# Patient Record
Sex: FEMALE | Race: BLACK OR AFRICAN AMERICAN | NOT HISPANIC OR LATINO | Employment: UNEMPLOYED | URBAN - METROPOLITAN AREA
[De-identification: names, ages, dates, MRNs, and addresses within clinical notes are randomized per-mention and may not be internally consistent; named-entity substitution may affect disease eponyms.]

---

## 2019-06-03 PROBLEM — E11.9 TYPE 2 DIABETES MELLITUS WITHOUT COMPLICATION, WITH LONG-TERM CURRENT USE OF INSULIN: Status: ACTIVE | Noted: 2019-06-03

## 2019-06-03 PROBLEM — I10 ESSENTIAL HYPERTENSION: Status: ACTIVE | Noted: 2019-06-03

## 2019-06-03 PROBLEM — Z79.4 TYPE 2 DIABETES MELLITUS WITHOUT COMPLICATION, WITH LONG-TERM CURRENT USE OF INSULIN: Status: ACTIVE | Noted: 2019-06-03

## 2020-02-04 PROBLEM — M25.561 RIGHT KNEE PAIN: Status: ACTIVE | Noted: 2020-02-04

## 2020-07-20 PROBLEM — I21.4 NSTEMI (NON-ST ELEVATED MYOCARDIAL INFARCTION): Status: ACTIVE | Noted: 2018-01-20

## 2021-01-09 PROBLEM — H25.813 COMBINED FORM OF AGE-RELATED CATARACT, BOTH EYES: Status: ACTIVE | Noted: 2021-01-09

## 2021-01-09 PROBLEM — E11.3493 SEVERE NONPROLIFERATIVE DIABETIC RETINOPATHY OF BOTH EYES WITHOUT MACULAR EDEMA ASSOCIATED WITH TYPE 2 DIABETES MELLITUS: Status: ACTIVE | Noted: 2021-01-09

## 2021-01-09 PROBLEM — H35.033 HYPERTENSIVE RETINOPATHY OF BOTH EYES: Status: ACTIVE | Noted: 2021-01-09

## 2021-01-24 PROBLEM — I25.9 ISCHEMIC HEART DISEASE: Status: ACTIVE | Noted: 2021-01-24

## 2021-02-21 PROBLEM — E87.6 DIURETIC-INDUCED HYPOKALEMIA: Status: ACTIVE | Noted: 2021-02-21

## 2021-02-21 PROBLEM — J81.0 ACUTE PULMONARY EDEMA: Status: ACTIVE | Noted: 2021-02-21

## 2021-02-21 PROBLEM — K02.9 DENTAL CARIES: Status: ACTIVE | Noted: 2017-03-09

## 2021-02-21 PROBLEM — T50.2X5A DIURETIC-INDUCED HYPOKALEMIA: Status: ACTIVE | Noted: 2021-02-21

## 2021-02-21 PROBLEM — I51.9 LV DYSFUNCTION: Status: ACTIVE | Noted: 2021-02-21

## 2021-02-21 PROBLEM — R07.9 CHEST PAIN: Status: ACTIVE | Noted: 2018-10-25

## 2021-02-21 PROBLEM — I42.9 CARDIOMYOPATHY: Status: ACTIVE | Noted: 2021-02-21

## 2021-03-25 PROBLEM — N17.9 AKI (ACUTE KIDNEY INJURY): Status: ACTIVE | Noted: 2021-03-25

## 2021-04-08 PROBLEM — R06.09 DYSPNEA ON EXERTION: Status: ACTIVE | Noted: 2021-04-08

## 2021-04-08 PROBLEM — I25.10 CAD (CORONARY ARTERY DISEASE): Status: ACTIVE | Noted: 2021-04-08

## 2021-04-08 PROBLEM — L60.9 NAIL PROBLEM: Status: ACTIVE | Noted: 2019-04-03

## 2021-04-08 PROBLEM — R79.89 TROPONIN LEVEL ELEVATED: Status: ACTIVE | Noted: 2021-04-08

## 2021-04-12 PROBLEM — R06.02 SHORTNESS OF BREATH: Status: ACTIVE | Noted: 2021-04-08

## 2021-05-15 PROBLEM — H35.033 HYPERTENSIVE RETINOPATHY OF BOTH EYES: Chronic | Status: ACTIVE | Noted: 2021-01-09

## 2021-05-15 PROBLEM — R79.89 TROPONIN LEVEL ELEVATED: Status: RESOLVED | Noted: 2021-04-08 | Resolved: 2021-05-15

## 2021-05-15 PROBLEM — Z79.4 TYPE 2 DIABETES MELLITUS WITH DIABETIC POLYNEUROPATHY, WITH LONG-TERM CURRENT USE OF INSULIN: Chronic | Status: ACTIVE | Noted: 2021-05-15

## 2021-05-15 PROBLEM — E66.09 CLASS 1 OBESITY DUE TO EXCESS CALORIES WITH BODY MASS INDEX (BMI) OF 31.0 TO 31.9 IN ADULT: Chronic | Status: ACTIVE | Noted: 2021-05-15

## 2021-05-15 PROBLEM — E11.42 TYPE 2 DIABETES MELLITUS WITH DIABETIC POLYNEUROPATHY, WITH LONG-TERM CURRENT USE OF INSULIN: Chronic | Status: ACTIVE | Noted: 2021-05-15

## 2021-05-15 PROBLEM — I42.9 CARDIOMYOPATHY: Chronic | Status: ACTIVE | Noted: 2021-02-21

## 2021-05-15 PROBLEM — I25.10 CAD (CORONARY ARTERY DISEASE): Chronic | Status: ACTIVE | Noted: 2021-04-08

## 2021-05-15 PROBLEM — I25.9 ISCHEMIC HEART DISEASE: Chronic | Status: ACTIVE | Noted: 2021-01-24

## 2021-05-15 PROBLEM — R06.02 SHORTNESS OF BREATH: Status: RESOLVED | Noted: 2021-04-08 | Resolved: 2021-05-15

## 2021-05-15 PROBLEM — I10 ESSENTIAL HYPERTENSION: Chronic | Status: ACTIVE | Noted: 2019-06-03

## 2021-05-15 PROBLEM — E66.811 CLASS 1 OBESITY DUE TO EXCESS CALORIES WITH BODY MASS INDEX (BMI) OF 31.0 TO 31.9 IN ADULT: Chronic | Status: ACTIVE | Noted: 2021-05-15

## 2021-05-15 PROBLEM — E11.42 TYPE 2 DIABETES MELLITUS WITH DIABETIC POLYNEUROPATHY, WITH LONG-TERM CURRENT USE OF INSULIN: Status: ACTIVE | Noted: 2021-05-15

## 2021-05-15 PROBLEM — E11.3493 SEVERE NONPROLIFERATIVE DIABETIC RETINOPATHY OF BOTH EYES WITHOUT MACULAR EDEMA ASSOCIATED WITH TYPE 2 DIABETES MELLITUS: Chronic | Status: ACTIVE | Noted: 2021-01-09

## 2021-05-15 PROBLEM — H25.813 COMBINED FORM OF AGE-RELATED CATARACT, BOTH EYES: Chronic | Status: ACTIVE | Noted: 2021-01-09

## 2021-05-15 PROBLEM — N17.9 AKI (ACUTE KIDNEY INJURY): Status: RESOLVED | Noted: 2021-03-25 | Resolved: 2021-05-15

## 2021-05-15 PROBLEM — Z79.4 TYPE 2 DIABETES MELLITUS WITH DIABETIC POLYNEUROPATHY, WITH LONG-TERM CURRENT USE OF INSULIN: Status: ACTIVE | Noted: 2021-05-15

## 2021-05-15 PROBLEM — R07.9 CHEST PAIN: Status: RESOLVED | Noted: 2018-10-25 | Resolved: 2021-05-15

## 2021-05-15 PROBLEM — I51.9 LV DYSFUNCTION: Chronic | Status: ACTIVE | Noted: 2021-02-21

## 2021-05-18 PROBLEM — J30.2 SEASONAL ALLERGIC RHINITIS: Chronic | Status: ACTIVE | Noted: 2021-05-18

## 2021-05-18 PROBLEM — G62.9 NEUROPATHY: Chronic | Status: ACTIVE | Noted: 2021-05-18

## 2021-05-24 PROBLEM — J81.0 ACUTE PULMONARY EDEMA: Status: RESOLVED | Noted: 2021-02-21 | Resolved: 2021-05-24

## 2021-05-24 PROBLEM — E66.811 CLASS 1 OBESITY DUE TO EXCESS CALORIES WITH SERIOUS COMORBIDITY AND BODY MASS INDEX (BMI) OF 32.0 TO 32.9 IN ADULT: Chronic | Status: ACTIVE | Noted: 2021-05-24

## 2021-05-24 PROBLEM — E66.09 CLASS 1 OBESITY DUE TO EXCESS CALORIES WITH SERIOUS COMORBIDITY AND BODY MASS INDEX (BMI) OF 32.0 TO 32.9 IN ADULT: Chronic | Status: ACTIVE | Noted: 2021-05-24

## 2021-10-04 PROBLEM — I10 UNCONTROLLED HYPERTENSION: Status: ACTIVE | Noted: 2021-10-04

## 2021-10-05 PROBLEM — T50.2X5A DIURETIC-INDUCED HYPOKALEMIA: Chronic | Status: ACTIVE | Noted: 2021-02-21

## 2021-10-05 PROBLEM — E87.6 DIURETIC-INDUCED HYPOKALEMIA: Chronic | Status: ACTIVE | Noted: 2021-02-21

## 2021-10-05 PROBLEM — I10 UNCONTROLLED HYPERTENSION: Chronic | Status: ACTIVE | Noted: 2021-10-04

## 2022-07-20 ENCOUNTER — TELEPHONE (OUTPATIENT)
Dept: ADMINISTRATIVE | Facility: HOSPITAL | Age: 47
End: 2022-07-20
Payer: MEDICAID

## 2022-10-20 PROBLEM — N91.1 AMENORRHEA, SECONDARY: Status: ACTIVE | Noted: 2022-10-20

## 2022-11-10 PROBLEM — R94.39 ABNORMAL NUCLEAR STRESS TEST: Status: ACTIVE | Noted: 2022-11-10

## 2022-12-28 PROBLEM — M17.11 OSTEOARTHRITIS OF RIGHT KNEE: Status: ACTIVE | Noted: 2022-12-28

## 2022-12-28 PROBLEM — S83.001A PATELLAR SUBLUXATION, RIGHT, INITIAL ENCOUNTER: Status: ACTIVE | Noted: 2022-12-28

## 2023-01-11 DIAGNOSIS — E11.9 TYPE 2 DIABETES MELLITUS WITHOUT COMPLICATION: ICD-10-CM

## 2023-02-27 ENCOUNTER — PATIENT OUTREACH (OUTPATIENT)
Dept: ADMINISTRATIVE | Facility: HOSPITAL | Age: 48
End: 2023-02-27
Payer: MEDICAID

## 2023-03-22 PROBLEM — M17.0 PRIMARY OSTEOARTHRITIS OF BOTH KNEES: Status: ACTIVE | Noted: 2023-03-22

## 2023-03-22 PROBLEM — E66.9 OBESITY (BMI 30.0-34.9): Status: ACTIVE | Noted: 2023-03-22

## 2023-03-22 PROBLEM — E66.811 OBESITY (BMI 30.0-34.9): Status: ACTIVE | Noted: 2023-03-22

## 2023-04-27 ENCOUNTER — PATIENT OUTREACH (OUTPATIENT)
Dept: ADMINISTRATIVE | Facility: HOSPITAL | Age: 48
End: 2023-04-27
Payer: MEDICAID

## 2023-05-25 PROBLEM — E11.9 DIABETES MELLITUS WITHOUT COMPLICATION: Status: ACTIVE | Noted: 2023-05-25

## 2023-07-27 ENCOUNTER — PATIENT OUTREACH (OUTPATIENT)
Dept: ADMINISTRATIVE | Facility: HOSPITAL | Age: 48
End: 2023-07-27
Payer: MEDICAID

## 2023-07-27 DIAGNOSIS — E11.9 TYPE 2 DIABETES MELLITUS WITHOUT COMPLICATION, WITHOUT LONG-TERM CURRENT USE OF INSULIN: Primary | ICD-10-CM

## 2023-08-23 PROBLEM — G56.31 MONONEUROPATHY OF RIGHT POSTERIOR INTEROSSEOUS NERVE: Status: ACTIVE | Noted: 2023-08-23

## 2023-09-11 PROBLEM — M25.531 RIGHT WRIST PAIN: Status: ACTIVE | Noted: 2023-09-11

## 2023-10-11 ENCOUNTER — SOCIAL WORK (OUTPATIENT)
Dept: ADMINISTRATIVE | Facility: OTHER | Age: 48
End: 2023-10-11
Payer: MEDICAID

## 2023-10-11 NOTE — PROGRESS NOTES
SW received consult for home health. SW placed a call to pt @ [phone] to discuss. Pt did not answer, SW left a voice message. Barrier to obtaining home health is pt's insurance/payor source. There is only one in network home health agency. SW will make hh referral pending making contact with pt and completion of last office visit note. SW following.     LOVELY Jaramillo    [phone] (phone)  [phone] (fax)

## 2024-03-01 PROBLEM — F14.10 COCAINE ABUSE: Status: ACTIVE | Noted: 2024-03-01

## 2024-06-29 PROBLEM — E11.9 TYPE 2 DIABETES MELLITUS: Status: ACTIVE | Noted: 2021-05-15

## 2024-06-29 PROBLEM — N39.0 UTI (URINARY TRACT INFECTION): Status: ACTIVE | Noted: 2024-06-29

## 2024-06-29 PROBLEM — I24.9 ACS (ACUTE CORONARY SYNDROME): Status: ACTIVE | Noted: 2024-06-29

## 2024-07-17 ENCOUNTER — PATIENT OUTREACH (OUTPATIENT)
Dept: ADMINISTRATIVE | Facility: HOSPITAL | Age: 49
End: 2024-07-17
Payer: MEDICAID

## 2024-07-17 DIAGNOSIS — E11.9 TYPE 2 DIABETES MELLITUS WITHOUT COMPLICATION, WITHOUT LONG-TERM CURRENT USE OF INSULIN: Primary | ICD-10-CM

## 2024-09-24 PROBLEM — B37.9 YEAST INFECTION: Status: ACTIVE | Noted: 2024-09-24

## 2024-09-30 PROBLEM — N39.0 UTI (URINARY TRACT INFECTION): Status: RESOLVED | Noted: 2024-06-29 | Resolved: 2024-09-30

## 2024-11-07 PROBLEM — R29.898 WEAKNESS OF BOTH ARMS: Status: ACTIVE | Noted: 2024-11-07

## 2024-11-07 PROBLEM — M79.621 PAIN IN BOTH UPPER ARMS: Status: ACTIVE | Noted: 2024-11-07

## 2024-11-07 PROBLEM — M79.622 PAIN IN BOTH UPPER ARMS: Status: ACTIVE | Noted: 2024-11-07

## 2025-03-19 PROBLEM — L60.3 DYSTROPHIC NAIL: Status: ACTIVE | Noted: 2025-03-19

## 2025-03-19 PROBLEM — E11.8 DIABETIC FOOT: Status: ACTIVE | Noted: 2025-03-19

## 2025-03-26 PROBLEM — R94.31 NONSPECIFIC ABNORMAL ELECTROCARDIOGRAM (ECG) (EKG): Status: ACTIVE | Noted: 2025-03-26

## 2025-07-01 PROBLEM — R09.89 SUSPECTED CEREBROVASCULAR ACCIDENT (CVA): Status: ACTIVE | Noted: 2025-07-01

## 2025-07-02 PROBLEM — E78.5 HYPERLIPIDEMIA: Status: ACTIVE | Noted: 2025-07-02

## 2025-07-03 PROBLEM — E11.9 TYPE 2 DIABETES MELLITUS: Status: ACTIVE | Noted: 2025-07-03

## 2025-07-03 PROBLEM — I63.50 CEREBROVASCULAR ACCIDENT (CVA) OF PONTINE STRUCTURE: Status: ACTIVE | Noted: 2025-07-01

## 2025-07-03 PROBLEM — I10 HYPERTENSION: Status: ACTIVE | Noted: 2025-07-03

## 2025-07-04 PROBLEM — F17.200 TOBACCO DEPENDENCE: Status: ACTIVE | Noted: 2025-07-04

## 2025-07-16 PROBLEM — I63.89 BRAINSTEM INFARCT, ACUTE: Status: ACTIVE | Noted: 2025-07-16

## 2025-07-16 PROBLEM — I67.89 HEMIPARESIS OF RIGHT DOMINANT SIDE DUE TO ACUTE CEREBROVASCULAR DISEASE: Status: ACTIVE | Noted: 2025-07-16

## 2025-07-16 PROBLEM — G81.91 HEMIPARESIS OF RIGHT DOMINANT SIDE DUE TO ACUTE CEREBROVASCULAR DISEASE: Status: ACTIVE | Noted: 2025-07-16

## 2025-07-16 PROBLEM — R29.818 ACUTE FOCAL NEUROLOGICAL DEFICIT: Status: ACTIVE | Noted: 2025-07-16

## 2025-07-16 PROBLEM — F19.10 SUBSTANCE ABUSE: Status: ACTIVE | Noted: 2025-07-16

## 2025-08-22 ENCOUNTER — OUTPATIENT CASE MANAGEMENT (OUTPATIENT)
Dept: ADMINISTRATIVE | Facility: OTHER | Age: 50
End: 2025-08-22
Payer: MEDICAID

## 2025-08-26 ENCOUNTER — OUTPATIENT CASE MANAGEMENT (OUTPATIENT)
Dept: ADMINISTRATIVE | Facility: OTHER | Age: 50
End: 2025-08-26
Payer: MEDICAID